# Patient Record
Sex: FEMALE | Race: WHITE | NOT HISPANIC OR LATINO | Employment: UNEMPLOYED | URBAN - METROPOLITAN AREA
[De-identification: names, ages, dates, MRNs, and addresses within clinical notes are randomized per-mention and may not be internally consistent; named-entity substitution may affect disease eponyms.]

---

## 2024-08-23 ENCOUNTER — HOSPITAL ENCOUNTER (EMERGENCY)
Facility: CLINIC | Age: 29
Discharge: HOME | End: 2024-08-23
Attending: EMERGENCY MEDICINE | Admitting: EMERGENCY MEDICINE
Payer: COMMERCIAL

## 2024-08-23 VITALS
SYSTOLIC BLOOD PRESSURE: 100 MMHG | HEART RATE: 79 BPM | OXYGEN SATURATION: 99 % | TEMPERATURE: 98.5 F | DIASTOLIC BLOOD PRESSURE: 70 MMHG | RESPIRATION RATE: 18 BRPM

## 2024-08-23 DIAGNOSIS — F32.A DEPRESSION, UNSPECIFIED DEPRESSION TYPE: ICD-10-CM

## 2024-08-23 PROBLEM — F33.1 MAJOR DEPRESSIVE DISORDER, RECURRENT EPISODE, MODERATE (H): Status: ACTIVE | Noted: 2024-08-23

## 2024-08-23 PROCEDURE — 99283 EMERGENCY DEPT VISIT LOW MDM: CPT

## 2024-08-23 ASSESSMENT — COLUMBIA-SUICIDE SEVERITY RATING SCALE - C-SSRS
6. HAVE YOU EVER DONE ANYTHING, STARTED TO DO ANYTHING, OR PREPARED TO DO ANYTHING TO END YOUR LIFE?: NO
2. HAVE YOU ACTUALLY HAD ANY THOUGHTS OF KILLING YOURSELF IN THE PAST MONTH?: NO
1. IN THE PAST MONTH, HAVE YOU WISHED YOU WERE DEAD OR WISHED YOU COULD GO TO SLEEP AND NOT WAKE UP?: YES

## 2024-08-23 ASSESSMENT — ACTIVITIES OF DAILY LIVING (ADL)
ADLS_ACUITY_SCORE: 35
ADLS_ACUITY_SCORE: 33
ADLS_ACUITY_SCORE: 35
ADLS_ACUITY_SCORE: 33

## 2024-08-23 NOTE — ED PROVIDER NOTES
"  Emergency Department Note      History of Present Illness     Chief Complaint   Psychiatric Evaluation      HPI   Mimi Hernandez is a currently 34w2d pregnant  28 year old female who presents to the ED for a psychiatric evaluation. 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 They talked to his harris who recommended she be seen here. States she was having a panic attack and she wanted to hurt herself \"at one point but I didn't do anything.\" States her  is here somewhere and her child is at his grandmother's. She is currently 34 weeks pregnant and has head no complications with the pregnancy. Denies medication changes or herbal medications. Denies other medical concerns.     Independent Historian   None    Review of External Notes   Reviewed an office visit from 2023 [facility]    Past Medical History     Medical History and Problem List   Anemia  Pyelonephritis  Sepsis    Medications   The patient is not currently taking any prescribed medications.    Physical Exam     Patient Vitals for the past 24 hrs:   BP Temp Temp src Pulse Resp SpO2   24 0652 100/70 98.5  F (36.9  C) Temporal 79 18 99 %     Physical Exam  General: Alert, No distress. Nontoxic appearance. Lying on her right side.  Head: No signs of trauma.   Mouth/Throat: Oropharynx moist.   Eyes: Conjunctivae are normal. Pupils are equal..   Neck: Normal range of motion.    CV: Appears well perfused.  Resp:No respiratory distress.   MSK: Normal range of motion. No obvious deformity.   Neuro: The patient is alert and interactive. POLO. Speech normal. GCS 15  Skin: No lesion or sign of trauma noted.   Psych: Flat mood and affect. behavior is normal.     Diagnostics     Lab Results   Labs Ordered and Resulted from " Time of ED Arrival to Time of ED Departure - No data to display    Imaging   No orders to display     Independent Interpretation   None    ED Course      Medications Administered   Medications - No data to display    Procedures   Procedures     Discussion of Management   DEC.  See their notes for details    ED Course   ED Course as of 08/23/24 0826   Fri Aug 23, 2024   0818 I obtained history and performed a physical exam as noted above.        Additional Documentation  None    Medical Decision Making / Diagnosis     CMS Diagnoses: None    MIPS       None    MDM   Mimi Hernandez is a 28 year old female who presents for evaluation of depressed mood.  There is no history of depression in the past and they are not on medications. There is no signs at this point of a general medical problem causing depression (brain tumor, metabolic derangement like hypothyroidism).      There are no signs of serious decompensation warranting psychiatric hospitalization or 72 hold (no suicidal or homicidal ideation, no danger to self).  Supportive outpatient management is therefore indicated.      I did have DEC evaluate the patient, their recommendations are noted in separate note.   They as well agree with the above recommendations.        Disposition   The patient was discharged.     Diagnosis     ICD-10-CM    1. Depression, unspecified depression type  F32.A            Discharge Medications   New Prescriptions    No medications on file         Scribe Disclosure:  I, Gely Davis, am serving as a scribe at 7:06 AM on 8/23/2024 to document services personally performed by Ramirez Tapia MD based on my observations and the provider's statements to me.        Ramirez Tapia MD  08/23/24 1046

## 2024-08-23 NOTE — CONSULTS
Diagnostic Evaluation Consultation  Crisis Assessment    Patient Name: Mimi Hernandez  Age:  28 year old  Legal Sex: female  Gender Identity: female  Pronouns: she/her  Race: White  Ethnicity: Not  or   Language: English      Patient was assessed: In person   Crisis Assessment Start Date: 08/23/24  Crisis Assessment Start Time: 0850  Crisis Assessment Stop Time: 0920  Patient location: [facility] EMERGENCY DEPT                                 Referral Data and Chief Complaint  Mimi Hernandez presents to the ED with family/friends. Patient is presenting to the ED for the following concerns: Significant behavioral change, Anxiety, Depression, Suicidal ideation.   Factors that make the mental health crisis life threatening or complex are:  Pt presented to the ED due to mood dysregulation and being disruptive at her husbands work place. Pt showed up at her husbands work and was yelling and trying to talk to her  while her toddler son was in the car. Pts husbands harris encouraged Pt and Pts  to go to the ED for further evaluation.      Informed Consent and Assessment Methods  Explained the crisis assessment process, including applicable information disclosures and limits to confidentiality, assessed understanding of the process, and obtained consent to proceed with the assessment.  Assessment methods included conducting a formal interview with patient, review of medical records, collaboration with medical staff, and obtaining relevant collateral information from family and community providers when available.  : done     Patient response to interventions: acceptance expressed, verbalizes understanding  Coping skills were attempted to reduce the crisis:  Pt has been receptive ED interventions. Pt does appear to be help seeking     History of the Crisis   Pt presented  with a somewhat anxious affect. Pts mood is congruent with this presentation. Pt was oriented x4. Pt was  "cooperative and engaged during assessment. Pt has a psychiatric hx of depression, anxiety and PTSD. Pt is also 34 weeks pregnant. Pt does not have a hx of past IP MH admissions. Pt currently has no outpatient providers. Pt currently presents to the ED due to mood dysregulation and behavior in the community. Pt endorsed that she was having a panic attack and \"wasn't thinking straight.\" Pt endorsed that she has been having more anxiety and mood dysregulation in the last couple of weeks. Pt endorsed some passive SI last night but denied any plan or intent. Pt denied any previous suicide attempts. Pt was able to safety plan with writer. Pt endorsed a past his of SIB- cutting but denied any recent SIB or urges. Pt did not endorse any HI or AH/VH. Pt denied any substance use. Pt was open to outpatient follow up with therapy and psychiatry. Pt appears motivated to engage in outpatient supports.    Brief Psychosocial History  Family:  , Children yes  Support System:    Employment Status:  homemaker  Source of Income:  salary/wages  Financial Environmental Concerns:  none  Current Hobbies:  music, outdoor activities, television/movies/videos, family functions  Barriers in Personal Life:  mental health concerns    Significant Clinical History  Current Anxiety Symptoms:  anxious, excessive worry, panic attack  Current Depression/Trauma:  thoughts of death/suicide, negativistic, withdrawl/isolation, sadness  Current Somatic Symptoms:  anxious, excessive worry, somatic symptoms (abdominal pain, headache, tension), wandering  Current Psychosis/Thought Disturbance:   (none reported)  Current Eating Symptoms:  loss of appetite  Chemical Use History:  Alcohol: None  Benzodiazepines: None  Opiates: None  Marijuana: None  Other Use: None   Past diagnosis:  Anxiety Disorder, Depression, PTSD  Family history:  No known history of mental health or chemical health concerns  Past treatment:  Individual therapy  Details of " most recent treatment:     Other relevant history:          Collateral Information  Is there collateral information: Yes     Collateral information name, relationship, phone number:  Deny (Spouse)  [phone] (Mobile)    What happened today: Writer spoke with Pts  he endorsed that Pts showed up at his work and was yelling his name repetitively and was crying and not really saying anything that made sense, Pt endorsed that she was panicking.     What is different about patient's functioning: Pt has been more anxious and has a hx of depression. Pt has not endorsed any SI recently but has made passive statements in the past. Pt has never attempted suicide. Pt has never endorsed HI or AH/VH or issues with substance use. Pts last pregnancy she had mood disturbances.     Concern about alcohol/drug use:  no    What do you think the patient needs:  mental health support     Has patient made comments about wanting to kill themselves/others: yes    If d/c is recommended, can they take part in safety/aftercare planning:  yes    Additional collateral information:  n/a     Risk Assessment  [place] Suicide Severity Rating Scale Full Clinical Version:  Suicidal Ideation  Q1 Wish to be Dead (Lifetime): Yes  Q2 Non-Specific Active Suicidal Thoughts (Lifetime): Yes  3. Active Suicidal Ideation with any Methods (Not Plan) Without Intent to Act (Lifetime): No  Q4 Active Suicidal Ideation with Some Intent to Act, Without Specific Plan (Lifetime): No  Q5 Active Suicidal Ideation with Specific Plan and Intent (Lifetime): No  Q6 Suicide Behavior (Lifetime): yes     Suicidal Behavior (Lifetime)  Actual Attempt (Lifetime): No  Has subject engaged in non-suicidal self-injurious behavior? (Lifetime): No  Interrupted Attempts (Lifetime): No  Aborted or Self-Interrupted Attempt (Lifetime): No  Preparatory Acts or Behavior (Lifetime): No    [place] Suicide Severity Rating Scale Recent:   Suicidal Ideation (Recent)  Q1 Wished to be Dead  (Past Month): yes  Q2 Suicidal Thoughts (Past Month): no  Level of Risk per Screen: low risk          Environmental or Psychosocial Events: other life stressors  Protective Factors: Protective Factors: strong bond to family unit, community support, or employment, responsibilities and duties to others, including pets and children, able to access care without barriers, sense of self-efficacy and/or positive self-esteem, optimistic outlook - identification of future goals, sense of belonging    Does the patient have thoughts of harming others? Feels Like Hurting Others: no  Previous Attempt to Hurt Others: no  Is the patient engaging in sexually inappropriate behavior?: no    Is the patient engaging in sexually inappropriate behavior?  no        Mental Status Exam   Affect: Blunted  Appearance: Appropriate  Attention Span/Concentration: Attentive  Eye Contact: Variable    Fund of Knowledge: Appropriate   Language /Speech Content: Fluent  Language /Speech Volume: Soft  Language /Speech Rate/Productions: Normal  Recent Memory: Intact  Remote Memory: Intact  Mood: Depressed, Anxious  Orientation to Person: Yes   Orientation to Place: Yes  Orientation to Time of Day: Yes  Orientation to Date: Yes     Situation (Do they understand why they are here?): Yes  Psychomotor Behavior: Normal  Thought Content: Clear  Thought Form: Intact        Medication  Psychotropic medications:   Medication Orders - Psychiatric (From admission, onward)      None             Current Care Team  Patient Care Team:  No Ref-Primary, Physician as PCP - General    Diagnosis  Patient Active Problem List   Diagnosis Code    Major depressive disorder, recurrent episode, moderate (H) F33.1       Primary Problem This Admission  Active Hospital Problems    Major depressive disorder, recurrent episode, moderate (H)        Clinical Summary and Substantiation of Recommendations   Pt is a 29 y/o female  with a psychiatric hx of depression, anxiety and PTSD. At  this time IP MH admission is not being recommended due to Pt not endorsing any SI/SIB/HI or AH/VH. Pt was oriented x4 and did not present with any psychosis sx. Pt was able to fully safety plan with writer. Pts current presentation appears to be chronic in nature and Pts current sx appear to be at Pts baseline. During current assessment Pt does not present with any modifiable risk factors that are likely to be mitigated in a hospital setting. Further, current sx and presentation are unlikely to be changed or alleviated by medication management or IP MH therapeutic interventions during a brief hospital stay. Pt does appear to be at higher risk of death by suicide accidental or intentional due to mental health hx and substance use hx.  At this time IP MH admission does not appear to be the most therapeutically beneficial intervention/ level of care for Pt. Pt appears to be able to use and motivated to engage in supportive mental health/ community resources. Pt was referred for therapy and psychiatry.      Patient coping skills attempted to reduce the crisis:  Pt has been receptive ED interventions. Pt does appear to be help seeking    Disposition  Recommended disposition: Individual Therapy, Medication Management        Reviewed case and recommendations with attending provider. Attending Name: MD Tapia       Attending concurs with disposition: yes       Patient and/or validated legal guardian concurs with disposition:   yes       Final disposition:  discharge    Legal status on admission: Voluntary/Patient has signed consent for treatment    Assessment Details   Total duration spent with the patient: 30 min     CPT code(s) utilized: 45256 - Psychotherapy for Crisis - 60 (30-74*) min    Vanessa Rossi [facility], Psychotherapist  [facility] - Triage & Transition Services  Callback: [phone]
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
## 2024-08-23 NOTE — DISCHARGE INSTRUCTIONS
Your Upcoming Appointments:  Type: Telepsychiatry  Date: Tuesday, 8/27/2024  Time: 9:00 am - 10:00 am  Provider: Perez Ty  MSN  CNP,PMHNP,RN  Location: [facility], [address] [place] [address] [place], [place] [address]  Phone: [phone]  Patient Instructions: All Intake appointments will be conducted via telehealth and must have access to video through smart phone or laptop/pc/tablet. You will be contacted by our office to set up the virtual meeting. If you have questions, please contact our office at [phone].    -----------------------------------------------------    Type: Teletherapy  Date: Wednesday, 8/28/2024  Time: 9:00 am - 10:00 am  Provider: Courtney THOMAS  Location: [facility], [place] [address]  Phone: [phone]  Patient Instructions: For Telehealth we will need your paperwork before you are seen. Email/fax/mail accepted. Website will give directions to us, necessary paperwork found at. [email or web address]     Mental health recommendations    Please follow-up with your outpatient team about your visit today.    2.  One of our care coordinators will reach out to you after your discharge.  If you have any questions about additional resources please call our coordinators at # [phone]    3.  Please use aftercare plan and already established coping skills and safety planning as needed.     4.  Please call 911 and/or return to the emergency department if her symptoms worsen or your safety becomes compromised.       [facility]  Website: [email or web address]  About: The new [facility] is an expansion of [facility], [place] s first intensive mental health program for pregnant and postpartum mothers. We aim to provide the best mental health and parenting support for families across our communities.  Phone:  [phone]  Address: [address] [place] [id number]    [facility]  Website: [email or web address]  About: [facility] proudly offers a unique, personalized experience for baby and their families with highest-level safety and care throughout the childbirth journey.  Phone: [place]: [phone], [place]: [phone], [place]: [phone]  Location(s): [facility]  About: Our Mother Baby Program will address the variety of changes that motherhood can bring by providing a supportive and non-judgmental atmosphere where mothers will learn strategies for coping with postpartum depression and anxiety, develop skills to help them bond with their child/children, and learn ways to handle stressful situations. The goal of the program is to learn healthy ways of coping with these changes in addition to discovering more about the birthing process and hormonal fluctuations your body goes through throughout pregnancy and after childbirth.    Babies up to age 1 are welcome to join mom at the group. The group rooms are designed to provide a warm and welcoming environment with reclining chairs for each mom and baby.  Website: [email or web address]  Phone: [phone]  Location(s): [facility], [facility], [facility], [facility].        The following DBT skills can assist me when: I want to act on your emotions and acting on them will only make things worse, I am overwhelmed by my emotions, I want to try to be skillful and not act on self destructive behavior.     Reduce Extreme Emotion  QUICKLY:  Changing Your Body Chemistry       T:  Change your body Temperature to change your autonomic nervous system    Use Ice pack to calm yourself down FAST. Place ice pack underneath your eyes for a count of 30 seconds to initiate the divers reflex which will  naturally calm down your heart rate and breathing.      I:  Intensely exercise to calm down a body revved up by emotion    Examples: running, walking fast, jumping, playing basketball, weight lifting, swimming, calisthenics, etc.      Engage in exercises that DO NOT include violent behaviors. Exercises that utilize violent behaviors tend to function as  behavioral rehearsal,  and rather than calming the person down, may actually  rev  the person up more, increasing the likelihood of violence, and lessening the likelihood that they will  burn off  energy     P:  Progressively relax your muscles    Starting with your hands, moving to your forearms, upper arms, shoulders, neck, forehead, eyes, cheeks and lips, tongue and teeth, chest, upper back, stomach, buttocks, thighs, calves, ankles, feet      Tense (10 seconds,   of the way), then relax each muscle (all the way)    Notice the tension    Notice the difference when relaxed (by tensing first, and then relaxing, you are able to get a more thorough relaxation than by simply relaxing)      P: Paced breathing to relax    The standard technique is to begin with counting the number of steps one takes for a typical inhale, then counting the steps one takes for a typical exhale, and then lengthening the amount of steps for the exhalation by one or two steps.  OR repeat this pattern for 1-2 minutes:   Inhale for four (4) seconds    Exhale for six (6) to eight (8) seconds        After using Distress Tolerance TIPP, TRY TO STOP!     S- Stop    Do not just react on your emotion urge. Stop! Freeze! Do not move a muscle! Your emotions may try to make you act without thinking. Stay in control! Take a step back Take a step back from the situation.    T- Take a break    Let go. Take a deep breath. Do not let your feelings make you act impulsively.    O- Observe    Notice what is going on inside and outside you. What is the situation? What are your thoughts and feelings? What are  others saying or doing? Does my emotion make sense, is it justified? What is it that my emotions want me to do? Would that be effective?    P- Proceed mindfully    Act with awareness. In deciding what to do, consider your thoughts and feelings, the situation, and other people s thoughts and feelings. Think about your goals. Ask Wise Mind: Which actions will make it better or worse?        If my emotion action urge would not be effective or helpful, practice acting OPPOSITE to the EMOTION ACTION URGE can help reduce the intensity or even change the emotion.   Consider these examples: with FEAR we have the urge to run away/avoid. OPPOSITE would be to approach it with caution. ANGER we have the urge to attack. OPPOSITE would be to gently avoid or to demonstrate kindness towards it. SADNESS we have the urge to withdraw/isolate. OPPOSITE would be to get self to move and be active physically or socially.      These additional skills may help with self-soothing and distracting you:      Activities   Focus attention on a task you need to get done. Rent movies; watch TV. Clean a room in your house. Find an event to go to. Play computer games. Go walking. Exercise. Surf the Internet. Write e-mails. Play sports. Go out for a meal or eat a favorite food. Call or go out with a friend. Listen to your iPod; download music. Build something. Spend time with your children. Play cards. Read magazines, books, comics. Do crossword puzzles or Sudoku.     Emotions   Read emotional books or stories, old letters. Watch emotional TV shows; go to emotional movies. Listen to emotional music. (Be sure the event creates different emotions.) Ideas: Scary movies, joke books, comedies, funny records, Spiritism music, soothing music or music that fires you up, going to a store and reading funny greeting cards.     Thoughts   Count to 10; count colors in a painting or poster or out the window; count anything. Repeat words to a song in your mind. Work  puzzles. Watch TV or read.     Sensations   Squeeze a rubber ball very hard. Listen to very loud music. Hold ice in your hand or mouth. Go out in the rain or snow. Take a hot or cold shower.   Remember that you can use your 5 senses as helpful self-soothing tools!       I can help my own emotions by practicing the following to keep my emotional mind healthy and bring positive emotions:     The ABC PLEASE skill is about taking good care of ourselves so that we can take care of others. Also, an important component of DBT is to reduce our vulnerability. When we take good care of ourselves, we are less likely to be vulnerable to disease and emotional crisis.     ABC   A- Accumulate positive emotions by doing things that are pleasant.   B- Build mastery by doing things we enjoy. Whether it is reading, cooking, cleaning, fixing a car, working a cross word puzzle, or playing a musical instrument. Practice these things to  and in time we feel competent.   C- [place] Ahead by rehearsing a plan ahead of time so that we can be prepared to cope skillfully. (Think of what makes situations difficult, and what helps in those situations)      PLEASE   Treat Physical Illness and take medications as prescribed.   Balance eating in order to avoid mood swings.   Avoid mood-Altering substances and have mood control.   Maintain good sleep so you can enjoy your life.   Get exercise to maintain high spirits.        Aftercare Plan    If I am feeling unsafe or I am in a crisis, I will:   Contact my established care providers   Call the National Suicide Prevention Lifeline: 597.683.7262   Go to the nearest emergency room   Call 911   Your [facility] has a mental health crisis team you can call 24/7: [facility], [phone]    Warning signs that I or other people might notice when a crisis is developing for me: panic attack, anxiety, feeling isolated     Things I am able to do on my own to cope or help me feel better:    -Practice square breathing when I begin to feel anxious - in breath through the nose for the count   of 4 and the first line on the square. Out breath through the mouth for the count of 4 for the second line   of the square. Repeat to complete the square. Repeat the square as many times as needed.    Things that I am able to do with others to cope or help me feel better: -Use community resources, including hotline numbers, [facility] crisis and support meetings    Things I can use or do for distraction: -Distraction skills of: going for walks, watching TV, spending time outside, calling a friend or family member  -Download a meditation marguerite and spend 15-20 minutes per day mediating/relaxing. Some apps to   download include: Calm, Headspace and Insight Timer. All 3 of these apps have free version    Changes I can make to support my mental health and wellness:   -Attend scheduled mental health therapy and psychiatric appointments and follow all recommendations  -Maintain a daily schedule/routine  -Practice deep breathing skills  -Abstain from all mood altering chemicals not currently prescribed to me     People in my life that I can ask for help: National [place] [employer] Mental Illness (ELDA)  539.969.1492 or 1.888.ELDA.HELPS    Other things that are important when I m in crisis: -Commit to 30 minutes of self care daily - this can be as simple as taking a shower, going for a walk, cooking a meal, read, writing, etc        Crisis Lines  Crisis Text Line  Text 591746  You will be connected with a trained live crisis counselor to provide support.    Por espanol, texto  FCO a 852792 o texto a 442-AYUDAME en WhatsApp    Box Hope Line  1.800.SUICIDE [6004682]      Community Resources  Fast Tracker  Linking people to mental health and substance use disorder resources  fasttrackermn.org     [place] Mental Health Warm Line  Peer to peer support  Monday thru Saturday, 12 pm to 10 pm  448.505.6898 or 2.396.267.4883   "Text \"Support\" to 77832    National [place] on Mental Illness (ELDA)  066.087.9716 or 1.888.ELDA.HELPS        Mental Health Apps  My3  https://[employer].org/    VirtualHopeBox  https://[employer]/apps/virtual-hope-box/      Additional Information  Today you were seen by a licensed mental health professional through Triage and Transition services, [facility] (P)  for a crisis assessment in the Emergency Department at [facility].  It is recommended that you follow up with your established providers (psychiatrist, mental health therapist, and/or primary care doctor - as relevant) as soon as possible. Coordinators from [facility] will be calling you in the next 24-48 hours to ensure that you have the resources you need.  You can also contact [facility] coordinators directly at [phone]. You may have been scheduled for or offered an appointment with a mental health provider. [facility] maintains an extensive network of licensed behavioral health providers to connect patients with the services they need.  We do not charge providers a fee to participate in our referral network.  We match patients with providers based on a patient's specific needs, insurance coverage, and location.  Our first effort will be to refer you to a provider within your care system, and will utilize providers outside your care system as needed.       "

## 2024-08-26 ENCOUNTER — TELEPHONE (OUTPATIENT)
Dept: BEHAVIORAL HEALTH | Facility: CLINIC | Age: 29
End: 2024-08-26
Payer: COMMERCIAL

## 2024-08-26 NOTE — TELEPHONE ENCOUNTER
Was speaking with ollie about appointments, line went dead. Attempted to call back patient and unable to connect.

## 2025-08-12 ENCOUNTER — HOSPITAL ENCOUNTER (OUTPATIENT)
Facility: CLINIC | Age: 30
Setting detail: OBSERVATION
Discharge: HOME | End: 2025-08-13
Attending: EMERGENCY MEDICINE | Admitting: PSYCHIATRY & NEUROLOGY
Payer: COMMERCIAL

## 2025-08-12 DIAGNOSIS — F41.1 GENERALIZED ANXIETY DISORDER WITH PANIC ATTACKS: Primary | ICD-10-CM

## 2025-08-12 DIAGNOSIS — Z34.91 FIRST TRIMESTER PREGNANCY: ICD-10-CM

## 2025-08-12 DIAGNOSIS — F41.0 GENERALIZED ANXIETY DISORDER WITH PANIC ATTACKS: Primary | ICD-10-CM

## 2025-08-12 PROBLEM — Z87.898 HISTORY OF DOMESTIC VIOLENCE: Status: ACTIVE | Noted: 2025-08-12

## 2025-08-12 PROBLEM — Z91.89 AT RISK FOR DOMESTIC VIOLENCE: Status: ACTIVE | Noted: 2025-08-12

## 2025-08-12 PROBLEM — F33.1 MAJOR DEPRESSIVE DISORDER, RECURRENT EPISODE, MODERATE (H): Status: ACTIVE | Noted: 2024-08-23

## 2025-08-12 PROCEDURE — 99285 EMERGENCY DEPT VISIT HI MDM: CPT | Performed by: EMERGENCY MEDICINE

## 2025-08-12 PROCEDURE — G0378 HOSPITAL OBSERVATION PER HR: HCPCS

## 2025-08-12 PROCEDURE — 99283 EMERGENCY DEPT VISIT LOW MDM: CPT | Performed by: EMERGENCY MEDICINE

## 2025-08-12 RX ORDER — PRENATAL VIT/IRON FUM/FOLIC AC 27MG-0.8MG
1 TABLET ORAL DAILY
Status: DISCONTINUED | OUTPATIENT
Start: 2025-08-13 | End: 2025-08-13 | Stop reason: HOSPADM

## 2025-08-12 RX ORDER — VITAMIN A, VITAMIN C, VITAMIN D-3, VITAMIN E, VITAMIN B-1, VITAMIN B-2, NIACIN, VITAMIN B-6, CALCIUM, IRON, ZINC, COPPER 4000; 120; 400; 22; 1.84; 3; 20; 10; 1; 12; 200; 27; 25; 2 [IU]/1; MG/1; [IU]/1; MG/1; MG/1; MG/1; MG/1; MG/1; MG/1; UG/1; MG/1; MG/1; MG/1; MG/1
1 TABLET ORAL DAILY
COMMUNITY

## 2025-08-12 RX ORDER — ACETAMINOPHEN 325 MG/1
650 TABLET ORAL EVERY 6 HOURS PRN
Status: DISCONTINUED | OUTPATIENT
Start: 2025-08-12 | End: 2025-08-13 | Stop reason: HOSPADM

## 2025-08-12 RX ORDER — HYDROXYZINE HCL 25 MG
12.5-25 TABLET ORAL 2 TIMES DAILY PRN
Status: DISCONTINUED | OUTPATIENT
Start: 2025-08-12 | End: 2025-08-12

## 2025-08-12 ASSESSMENT — ACTIVITIES OF DAILY LIVING (ADL)
ADLS_ACUITY_SCORE: 41

## 2025-08-12 ASSESSMENT — COLUMBIA-SUICIDE SEVERITY RATING SCALE - C-SSRS
1. IN THE PAST MONTH, HAVE YOU WISHED YOU WERE DEAD OR WISHED YOU COULD GO TO SLEEP AND NOT WAKE UP?: NO
2. HAVE YOU ACTUALLY HAD ANY THOUGHTS OF KILLING YOURSELF IN THE PAST MONTH?: NO
6. HAVE YOU EVER DONE ANYTHING, STARTED TO DO ANYTHING, OR PREPARED TO DO ANYTHING TO END YOUR LIFE?: NO

## 2025-08-13 VITALS
TEMPERATURE: 98 F | HEART RATE: 72 BPM | RESPIRATION RATE: 18 BRPM | HEIGHT: 66 IN | OXYGEN SATURATION: 98 % | WEIGHT: 111.4 LBS | DIASTOLIC BLOOD PRESSURE: 58 MMHG | SYSTOLIC BLOOD PRESSURE: 92 MMHG | BODY MASS INDEX: 17.9 KG/M2

## 2025-08-13 PROCEDURE — 250N000013 HC RX MED GY IP 250 OP 250 PS 637: Performed by: NURSE PRACTITIONER

## 2025-08-13 PROCEDURE — G0378 HOSPITAL OBSERVATION PER HR: HCPCS

## 2025-08-13 RX ADMIN — PRENATAL VITAMINS-IRON FUMARATE 27 MG IRON-FOLIC ACID 0.8 MG TABLET 1 TABLET: at 08:26

## 2025-08-13 ASSESSMENT — ACTIVITIES OF DAILY LIVING (ADL)
ADLS_ACUITY_SCORE: 41

## 2025-08-14 ENCOUNTER — TELEPHONE (OUTPATIENT)
Dept: BEHAVIORAL HEALTH | Facility: CLINIC | Age: 30
End: 2025-08-14
Payer: COMMERCIAL